# Patient Record
Sex: FEMALE | Race: BLACK OR AFRICAN AMERICAN | Employment: PART TIME | URBAN - METROPOLITAN AREA
[De-identification: names, ages, dates, MRNs, and addresses within clinical notes are randomized per-mention and may not be internally consistent; named-entity substitution may affect disease eponyms.]

---

## 2018-02-10 ENCOUNTER — OFFICE VISIT (OUTPATIENT)
Dept: FAMILY MEDICINE CLINIC | Facility: CLINIC | Age: 18
End: 2018-02-10

## 2018-02-10 DIAGNOSIS — Z11.1 SCREENING FOR TUBERCULOSIS: Primary | ICD-10-CM

## 2018-02-10 PROCEDURE — 86580 TB INTRADERMAL TEST: CPT | Performed by: PHYSICIAN ASSISTANT

## 2018-02-10 NOTE — PROGRESS NOTES
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  3 negative neg PPD in the past.   No PPD risk. PPD questionnaire was completed reviewed and benign. It has been scanned to the EMR. 1st step  PPD placed without complications.   Follow up

## 2018-02-10 NOTE — PATIENT INSTRUCTIONS
You will need to return to clinic in 48-72 hours to have results of TB test read. Please return to clinic on 2/12/18 between 2:30 pm and 7:30 pm or on 2/13/18 between 8:00 am and 2:30 pm to have your TB test read.

## 2018-02-13 ENCOUNTER — OFFICE VISIT (OUTPATIENT)
Dept: FAMILY MEDICINE CLINIC | Facility: CLINIC | Age: 18
End: 2018-02-13

## 2018-02-13 DIAGNOSIS — Z11.1 ENCOUNTER FOR PPD SKIN TEST READING: Primary | ICD-10-CM

## 2018-02-13 NOTE — PROGRESS NOTES
Feeling well.  0 mm induration for 1st step PPD. Follow up for 2nd step placement between  2/17/18 and 2/24/18. Proof of negative step 1 was provided.

## 2018-02-18 ENCOUNTER — OFFICE VISIT (OUTPATIENT)
Dept: FAMILY MEDICINE CLINIC | Facility: CLINIC | Age: 18
End: 2018-02-18

## 2018-02-18 DIAGNOSIS — Z11.1 SCREENING FOR TUBERCULOSIS: Primary | ICD-10-CM

## 2018-02-18 PROCEDURE — 86580 TB INTRADERMAL TEST: CPT | Performed by: FAMILY MEDICINE

## 2018-02-18 NOTE — PROGRESS NOTES
Pt here for 2nd tb test. Needed for nursing school. Last tb test placed 2/10 and read on 2/13. Neg. Ppd placed in left forearm. Pt confirms she is able to return in 48-72 hours for ppd read. Printed return instructions provided to pt.

## 2018-02-18 NOTE — PATIENT INSTRUCTIONS
You will need to return to clinic in 48-72 hours to have results of TB test read. Please return to clinic on 2/20 after 10:45am or on 2/21 before 10:45 to have your TB test read.     If you do not return during this time your test will need to be repeat

## 2018-02-20 ENCOUNTER — OFFICE VISIT (OUTPATIENT)
Dept: FAMILY MEDICINE CLINIC | Facility: CLINIC | Age: 18
End: 2018-02-20

## 2018-02-20 DIAGNOSIS — Z11.1 TUBERCULIN SKIN TEST READING ENCOUNTER: Primary | ICD-10-CM

## 2019-03-19 ENCOUNTER — HOSPITAL (OUTPATIENT)
Dept: OTHER | Age: 19
End: 2019-03-19
Attending: EMERGENCY MEDICINE

## 2021-03-04 ENCOUNTER — OFFICE VISIT (OUTPATIENT)
Dept: PODIATRY CLINIC | Facility: CLINIC | Age: 21
End: 2021-03-04
Payer: MEDICAID

## 2021-03-04 DIAGNOSIS — M21.612 BILATERAL BUNIONS: ICD-10-CM

## 2021-03-04 DIAGNOSIS — M79.671 PAIN IN BOTH FEET: Primary | ICD-10-CM

## 2021-03-04 DIAGNOSIS — M21.611 BILATERAL BUNIONS: ICD-10-CM

## 2021-03-04 DIAGNOSIS — M79.672 PAIN IN BOTH FEET: Primary | ICD-10-CM

## 2021-03-04 DIAGNOSIS — M20.42 HAMMERTOES OF BOTH FEET: ICD-10-CM

## 2021-03-04 DIAGNOSIS — M20.41 HAMMERTOES OF BOTH FEET: ICD-10-CM

## 2021-03-04 PROCEDURE — 99203 OFFICE O/P NEW LOW 30 MIN: CPT | Performed by: PODIATRIST

## 2021-03-04 NOTE — PROGRESS NOTES
HPI:    Patient ID: Kayleen Perdue is a 23year old female. Pleasant 53-year-old female presents as a new patient to me and is referred by her sister. The reason for this visit is pain associated with the first and second toes of both feet.   She state placed in this encounter.       Meds This Visit:  Requested Prescriptions      No prescriptions requested or ordered in this encounter       Imaging & Referrals:  None       #[id number]

## (undated) NOTE — LETTER
February 13, 2018    [place] [id number]      Dear May Churchill:    The following are the results of your recent tests. Please review the list of test results.   Your result is the value on the left; we have also supplied the